# Patient Record
Sex: FEMALE | Race: BLACK OR AFRICAN AMERICAN | NOT HISPANIC OR LATINO | URBAN - METROPOLITAN AREA
[De-identification: names, ages, dates, MRNs, and addresses within clinical notes are randomized per-mention and may not be internally consistent; named-entity substitution may affect disease eponyms.]

---

## 2018-07-12 ENCOUNTER — EMERGENCY (EMERGENCY)
Facility: HOSPITAL | Age: 43
LOS: 1 days | Discharge: HOME | End: 2018-07-12
Attending: EMERGENCY MEDICINE | Admitting: EMERGENCY MEDICINE
Payer: COMMERCIAL

## 2018-07-12 VITALS
SYSTOLIC BLOOD PRESSURE: 175 MMHG | TEMPERATURE: 98 F | DIASTOLIC BLOOD PRESSURE: 118 MMHG | OXYGEN SATURATION: 98 % | WEIGHT: 216.05 LBS | RESPIRATION RATE: 18 BRPM | HEART RATE: 80 BPM

## 2018-07-12 PROCEDURE — 93010 ELECTROCARDIOGRAM REPORT: CPT

## 2018-07-12 PROCEDURE — 70360 X-RAY EXAM OF NECK: CPT | Mod: 26

## 2018-07-12 PROCEDURE — 99284 EMERGENCY DEPT VISIT MOD MDM: CPT | Mod: 25

## 2018-07-12 RX ORDER — POTASSIUM CHLORIDE 20 MEQ
40 PACKET (EA) ORAL ONCE
Qty: 0 | Refills: 0 | Status: COMPLETED | OUTPATIENT
Start: 2018-07-12 | End: 2018-07-12

## 2018-07-12 RX ORDER — SODIUM CHLORIDE 9 MG/ML
1000 INJECTION INTRAMUSCULAR; INTRAVENOUS; SUBCUTANEOUS ONCE
Qty: 0 | Refills: 0 | Status: COMPLETED | OUTPATIENT
Start: 2018-07-12 | End: 2018-07-12

## 2018-07-12 NOTE — ED ADULT NURSE NOTE - OBJECTIVE STATEMENT
Pt aaox3, breathing RA freely, no SOB, skin warm and dry. Complaining of orthopnea, intermittent swelling to right side of neck and throat pain x 3 months; swollen lymph nodes as per pt. Generalized weakness x 1 day. Moving extremities freely, gait steady. Pending lab results; will continue to monitor.

## 2018-07-12 NOTE — ED ADULT NURSE NOTE - NS ED NURSE DC INFO COMPLEXITY
Simple: Patient demonstrates quick and easy understanding/Verbalized Understanding/D/C instructions not given

## 2018-07-12 NOTE — ED ADULT TRIAGE NOTE - OTHER COMPLAINTS
biba from home c/o of generalized weakness since today, with intermitent right sided chest pain and numnbess

## 2018-07-13 DIAGNOSIS — Z98.891 HISTORY OF UTERINE SCAR FROM PREVIOUS SURGERY: Chronic | ICD-10-CM

## 2018-07-13 PROCEDURE — 36415 COLL VENOUS BLD VENIPUNCTURE: CPT

## 2018-07-13 PROCEDURE — 82550 ASSAY OF CK (CPK): CPT

## 2018-07-13 PROCEDURE — 99283 EMERGENCY DEPT VISIT LOW MDM: CPT | Mod: 25

## 2018-07-13 PROCEDURE — 82553 CREATINE MB FRACTION: CPT

## 2018-07-13 PROCEDURE — 85730 THROMBOPLASTIN TIME PARTIAL: CPT

## 2018-07-13 PROCEDURE — 84484 ASSAY OF TROPONIN QUANT: CPT

## 2018-07-13 PROCEDURE — 70360 X-RAY EXAM OF NECK: CPT

## 2018-07-13 PROCEDURE — 93005 ELECTROCARDIOGRAM TRACING: CPT

## 2018-07-13 PROCEDURE — 85025 COMPLETE CBC W/AUTO DIFF WBC: CPT

## 2018-07-13 PROCEDURE — 85610 PROTHROMBIN TIME: CPT

## 2018-07-13 PROCEDURE — 84443 ASSAY THYROID STIM HORMONE: CPT

## 2018-07-13 PROCEDURE — 70360 X-RAY EXAM OF NECK: CPT | Mod: 26

## 2018-07-13 PROCEDURE — 83735 ASSAY OF MAGNESIUM: CPT

## 2018-07-13 PROCEDURE — 80053 COMPREHEN METABOLIC PANEL: CPT

## 2018-07-13 PROCEDURE — 84439 ASSAY OF FREE THYROXINE: CPT

## 2018-07-13 RX ADMIN — Medication 40 MILLIEQUIVALENT(S): at 00:18

## 2018-07-13 RX ADMIN — SODIUM CHLORIDE 1000 MILLILITER(S): 9 INJECTION INTRAMUSCULAR; INTRAVENOUS; SUBCUTANEOUS at 00:20

## 2018-07-13 NOTE — ED PROVIDER NOTE - DIAGNOSTIC INTERPRETATION
ER Physician: Noble  INTERPRETATION:  no acute fracture; no soft tissue swelling noted; normal bony alignment.

## 2018-07-13 NOTE — ED PROVIDER NOTE - MEDICAL DECISION MAKING DETAILS
generalized weakness, no focal neuro deficits, no resp distress, no airway compromise, difficulty breathing at night, however asymptomatic currently. fatigue may be viral syndrome vs. poor sleep quality  -check labs, ekg, ivf

## 2018-07-13 NOTE — ED PROVIDER NOTE - OBJECTIVE STATEMENT
43F hx htn, c/o feeling unwell today.  states generalized weakness, fatigue and decreased energy. no n/v/d. no fevers. no chest pain, no SOB. no dizziness. no HA.   over past 3 weeks has had R sided neck pain and difficulty breathing when she lays down at night.   has been seen by ENT and given fluticasone and montelukast.  PMD started her on ranitidine 2 days ago.   was taken off losartan last week and changed to HCTZ.  no swelling, no voice changes, no difficulty breathing currently. pt  has been having a lot of trouble sleeping.

## 2018-07-13 NOTE — ED PROVIDER NOTE - ENMT, MLM
Airway patent, Nasal mucosa clear. Mouth with normal mucosa. Throat has no vesicles, no oropharyngeal exudates and uvula is midline. no trismus, no stridor, no drooling, TMs clear, no swelling

## 2018-07-16 DIAGNOSIS — R53.81 OTHER MALAISE: ICD-10-CM

## 2018-07-16 DIAGNOSIS — M54.2 CERVICALGIA: ICD-10-CM

## 2018-07-16 DIAGNOSIS — I10 ESSENTIAL (PRIMARY) HYPERTENSION: ICD-10-CM

## 2018-07-16 DIAGNOSIS — R53.83 OTHER FATIGUE: ICD-10-CM

## 2018-07-16 DIAGNOSIS — R53.1 WEAKNESS: ICD-10-CM

## 2020-06-01 NOTE — ED ADULT TRIAGE NOTE - RESPIRATORY RATE (BREATHS/MIN)
Patients daughter would like for you to call her. She stated that you called and left a message with the patient and he did not understand the message.  She can be reached at [phone]

## 2020-12-10 NOTE — ED ADULT TRIAGE NOTE - TEMPERATURE IN FAHRENHEIT (DEGREES F)
[Dear  ___] : Dear  [unfilled], [Consult Letter:] : I had the pleasure of evaluating your patient, [unfilled]. [Please see my note below.] : Please see my note below. [Consult Closing:] : Thank you very much for allowing me to participate in the care of this patient.  If you have any questions, please do not hesitate to contact me. [Sincerely,] : Sincerely, [FreeTextEntry2] : CARMEN HEMPHILL [FreeTextEntry3] : Rose Love MD\par Attending Physician, Division of Allergy/Immunology\par [facility]  [phone]